# Patient Record
Sex: FEMALE | Race: WHITE | NOT HISPANIC OR LATINO | Employment: FULL TIME | URBAN - METROPOLITAN AREA
[De-identification: names, ages, dates, MRNs, and addresses within clinical notes are randomized per-mention and may not be internally consistent; named-entity substitution may affect disease eponyms.]

---

## 2024-05-30 ENCOUNTER — TRANSCRIBE ORDERS (OUTPATIENT)
Dept: ADMINISTRATIVE | Facility: HOSPITAL | Age: 64
End: 2024-05-30
Payer: COMMERCIAL

## 2024-05-30 DIAGNOSIS — Z12.31 VISIT FOR SCREENING MAMMOGRAM: Primary | ICD-10-CM

## 2024-12-20 ENCOUNTER — OFFICE VISIT (OUTPATIENT)
Dept: CARDIOLOGY | Facility: CLINIC | Age: 64
End: 2024-12-20
Payer: COMMERCIAL

## 2024-12-20 VITALS
DIASTOLIC BLOOD PRESSURE: 83 MMHG | BODY MASS INDEX: 30.48 KG/M2 | HEIGHT: 63 IN | OXYGEN SATURATION: 96 % | WEIGHT: 172 LBS | HEART RATE: 95 BPM | SYSTOLIC BLOOD PRESSURE: 149 MMHG

## 2024-12-20 DIAGNOSIS — R07.2 PRECORDIAL PAIN: Primary | ICD-10-CM

## 2024-12-20 DIAGNOSIS — R06.02 SHORTNESS OF BREATH: ICD-10-CM

## 2024-12-20 DIAGNOSIS — I10 PRIMARY HYPERTENSION: ICD-10-CM

## 2024-12-20 PROCEDURE — 99204 OFFICE O/P NEW MOD 45 MIN: CPT | Performed by: PHYSICIAN ASSISTANT

## 2024-12-20 RX ORDER — PROPRANOLOL HYDROCHLORIDE 10 MG/1
1 TABLET ORAL DAILY
COMMUNITY
Start: 2024-12-06

## 2024-12-20 NOTE — PROGRESS NOTES
Subjective   Mary Kate Castro is a 64 y.o. female     Chief Complaint   Patient presents with    [facility] Care     Cardiac eval - abnormal EKG, hypertension     Chest Pain    Shortness of Breath       HPI  The patient presents in the clinic today to [facility] cardiovascular care.  The patient is referred in the setting of chest pain and dyspnea, and some degree of hypertension.  She describes chest pain for approximately 1 month duration.  Her main episode occurred 1 month ago.  She described mostly precordial chest tightness.  She feels that a lot of this was related to stress.  Nonetheless, she saw her primary care provider recently and reported symptoms.  She has been referred on for evaluation.  She reports that her above episode was mostly again, precordial pressure.  She had no associated neck, arm, or jaw discomfort.  There is associated dyspnea and fatigue with this episode.  Again, she had significant stress at that time.  She has had some degree of mild chest tightness since, but nothing of significance.  She does note that baseline dyspnea and fatigue has been rather significant.  Rarely she will have palpitations but no sustained dysrhythmic activity.  There is been no failure nor dysrhythmic issues.  EKG was performed with her primary care provider as above.  There was question of anterior infarct, however with review of EKG I do not detect this.  She has sinus rhythm with left anterior fascicular block.  Still, with clinical scenario, she has been referred for evaluation.  She presents today in the setting.  Of note, she was just recently started on propranolol for further hypertensive control.  She is slightly hypertensive today but feels that blood pressures are slowly improving.  The patient has no further complaints.      Current Outpatient Medications   Medication Sig Dispense Refill    propranolol (INDERAL) 10 MG tablet Take 1 tablet by mouth Daily.       No current facility-administered medications  "for this visit.       Patient has no known allergies.    Past Medical History:   Diagnosis Date    Hypertension     Kidney donor     Liver donor     Tinnitus        Social History     Socioeconomic History    Marital status:    Tobacco Use    Smoking status: Every Day     Types: Cigarettes    Smokeless tobacco: Never   Substance and Sexual Activity    Alcohol use: Not Currently    Drug use: Never    Sexual activity: Defer       Family History   Problem Relation Age of Onset    Hyperlipidemia Mother     Hypertension Mother     Diabetes Mother     Heart disease Maternal Grandmother     Heart disease Maternal Grandfather        Review of Systems   Constitutional:  Positive for fatigue. Negative for chills, diaphoresis and fever.   HENT: Negative.     Eyes: Negative.  Negative for visual disturbance.   Respiratory:  Positive for chest tightness and shortness of breath. Negative for apnea, cough and wheezing.    Cardiovascular:  Positive for chest pain. Negative for palpitations and leg swelling.   Gastrointestinal: Negative.  Negative for abdominal pain and blood in stool.   Endocrine: Negative.    Genitourinary: Negative.  Negative for hematuria.   Musculoskeletal:  Positive for arthralgias, back pain and neck pain. Negative for myalgias and neck stiffness.   Skin: Negative.  Negative for rash and wound.   Allergic/Immunologic: Negative.  Negative for environmental allergies and food allergies.   Neurological: Negative.  Negative for dizziness, syncope, weakness, light-headedness, numbness and headaches.   Hematological: Negative.  Does not bruise/bleed easily.   Psychiatric/Behavioral: Negative.  Negative for sleep disturbance.        Objective     Vitals:    12/20/24 1000   BP: 149/83   Pulse: 95   SpO2: 96%   Weight: 78 kg (172 lb)   Height: 160 cm (63\")        /83   Pulse 95   Ht 160 cm (63\")   Wt 78 kg (172 lb)   SpO2 96%   BMI 30.47 kg/m²      Lab Results (most recent)       None      "       Physical Exam  Vitals and nursing note reviewed.   Constitutional:       General: She is not in acute distress.     Appearance: She is well-developed.   HENT:      Head: Normocephalic and atraumatic.   Eyes:      Conjunctiva/sclera: Conjunctivae normal.      Pupils: Pupils are equal, round, and reactive to light.   Neck:      Vascular: No JVD.      Trachea: No tracheal deviation.   Cardiovascular:      Rate and Rhythm: Normal rate and regular rhythm.      Heart sounds: Normal heart sounds.   Pulmonary:      Effort: Pulmonary effort is normal.      Breath sounds: Normal breath sounds.   Abdominal:      General: Bowel sounds are normal. There is no distension.      Palpations: Abdomen is soft. There is no mass.      Tenderness: There is no abdominal tenderness. There is no guarding or rebound.   Musculoskeletal:         General: No tenderness or deformity. Normal range of motion.      Cervical back: Normal range of motion and neck supple.   Skin:     General: Skin is warm and dry.      Coloration: Skin is not pale.      Findings: No erythema or rash.   Neurological:      Mental Status: She is alert and oriented to person, place, and time.   Psychiatric:         Behavior: Behavior normal.         Thought Content: Thought content normal.         Judgment: Judgment normal.         Procedure   Procedures         Assessment & Plan      Diagnosis Plan   1. Precordial pain  Adult Transthoracic Echo Complete W/ Cont if Necessary Per Protocol    Stress Test With Myocardial Perfusion One Day      2. Shortness of breath  Adult Transthoracic Echo Complete W/ Cont if Necessary Per Protocol    Stress Test With Myocardial Perfusion One Day      3. Primary hypertension          1.  The patient presents for further evaluation of symptoms and overall clinical scenario otherwise as above.  She does note chest pain, but feels this is likely related to stress.  Still, EKG was performed and suggested sinus rhythm with possible  anterior wall infarct pattern.  With review, the patient has sinus rhythm with left anterior fascicular block pattern.  No acute changes are noted.  No infarct pattern is appreciated.  We have reviewed this in detail with her today.    2.  With symptoms, we will schedule for noninvasive testing.  We will schedule for nuclear stress test for ischemia assessment.    3.  We will schedule for an echo as well.  We can evaluate systolic function and for wall motion abnormalities otherwise.    4.  With hypertension, the patient has been instituted on propranolol by her primary care provider.  She feels that blood pressures are progressively improving.  I will continue propranolol without change.  I have asked that she continue to monitor blood pressures closely.  We can adjust further if needed.    5.  Further pending all the above.  If benign, no further evaluation would be indicated.           Mary Kate Castro  reports that she has been smoking cigarettes. She has never used smokeless tobacco. I have educated her on the risk of diseases from using tobacco products such as cancer, COPD, and heart disease.     I advised her to quit and she is not willing to quit.    I spent 3  minutes counseling the patient.          Advance Care Planning   ACP discussion was held with the patient during this visit. Patient does not have an advance directive, declines further assistance.              Electronically signed by:

## 2024-12-23 ENCOUNTER — PATIENT ROUNDING (BHMG ONLY) (OUTPATIENT)
Dept: CARDIOLOGY | Facility: CLINIC | Age: 64
End: 2024-12-23
Payer: COMMERCIAL

## 2024-12-23 NOTE — PROGRESS NOTES
December 23, 2024    Hello, may I speak with Mary Kate Castro?    My name is FAHAD LR      I am  with [facility]  [address] [place] [id number] [phone].    Before we get started may I verify your date of birth? 1960    I am calling to officially welcome you to our practice and ask about your recent visit. Is this a good time to talk? NO    PT IS AT THE HOSPITAL WAITING TO SPEAK WITH THE MD REGARDING HER MOM AND WILL CALL BACK AT HER CONVENIENCE.      Thank you, and have a great day.